# Patient Record
(demographics unavailable — no encounter records)

---

## 2025-02-24 NOTE — HISTORY OF PRESENT ILLNESS
[Home] : at home, [unfilled] , at the time of the visit. [Medical Office: ([facility])___] : at the medical office located in  [Verbal consent obtained from patient] : the patient, [unfilled] [Cataplexy] : cataplexy [Sleep Paralysis] : sleep paralysis [Hypnagogic Hallucinations] : hallucinations when falling asleep [FreeTextEntry1] : This is a 33-year-old male with narcolepsy with cataplexy here for a follow up visit.  Comorbid medical conditions include migraines, bipolar disorder, substance use disorder on Sublocade monitored through [facility] outreach and is registered with [facility] and gets weekly random tox screens, and history of ADHD.  Lumryz is working well. He currently takes Wakix and Mydayis 50 mg in AM and Adderall XR 30 mg only as needed or armodafinil 250 mg. This regimen has been working well.   WatchPAT (11/17/2019) pAHI of 3/hr. PSG/MSLT (3/10/2020) AHI 2.8/hr, MSOL 2.5 minutes, 3/4 SOREMPs. PSG/MSLT (1/19/2020) AHI 0.8/hr, MSOL 3.25 minutes, 2/4 SOREMPs.  Current regimen: Lumryz (9/2023 - current) Xywav (4/2020 - 12/16/2022, 1/5/2023 - 9/2023). Wakix (11/2020 - 4/2021, 5/2021 - current). Myadis 50 mg  Adderall XR 30 mg/armodafinil 250 mg prn  Medication History: He was diagnosed with ADHD when he was younger and previously has tried Vyvanse, Methylphenidate, Adderall. He found Vyvanse to be the most "smooth".  Sunosi: Not effective Methylphenidate: irritable Wakix: reported no significant effect and paused for 1 month but felt lows were worse so resumed. Modafinil: suboptimal effect Armodafinil: Tolerance Xyrem: 12/2020 - 4/2020 Dexedrine 10 mg TID prn - unavailable.  Vyvanse 70 mg - effective but difficulty to find due to shortage  [Hypnopompic Hallucinations] : no hallucinations when awakening

## 2025-02-24 NOTE — ASSESSMENT
[FreeTextEntry1] : This is 33-year-old male with narcolepsy with cataplexy here for a follow up visit via telehealth. Doing well with current regimen. Continue Lumryz, Wakix, Mydayis 50 mg and Adderall XR 30 mg in afternoon or armodafinil 250 as needed. Follow up in 6 months, sooner if needed.

## 2025-02-24 NOTE — HISTORY OF PRESENT ILLNESS
[Home] : at home, [unfilled] , at the time of the visit. [Medical Office: ([facility])___] : at the medical office located in  [Verbal consent obtained from patient] : the patient, [unfilled] [Cataplexy] : cataplexy [Sleep Paralysis] : sleep paralysis [Hypnagogic Hallucinations] : hallucinations when falling asleep [FreeTextEntry1] : This is a 33-year-old male with narcolepsy with cataplexy here for a follow up visit.  Comorbid medical conditions include migraines, bipolar disorder, substance use disorder on Sublocade monitored through [facility] outreach and is registered with [facility] and gets weekly random tox screens, and history of ADHD.  Lumryz is working well. He currently takes Wakix and Mydayis 50 mg in AM and Adderall XR 30 mg only as needed or armodafinil 250 mg. This regimen has been working well.   WatchPAT (11/17/2019) pAHI of 3/hr. PSG/MSLT (3/10/2020) AHI 2.8/hr, MSOL 2.5 minutes, 3/4 SOREMPs. PSG/MSLT (1/19/2020) AHI 0.8/hr, MSOL 3.25 minutes, 2/4 SOREMPs.  Current regimen: Lumryz (9/2023 - current) Xywav (4/2020 - 12/16/2022, 1/5/2023 - 9/2023). Wakix (11/2020 - 4/2021, 5/2021 - current). Myadis 50 mg  Adderall XR 30 mg/armodafinil 250 mg prn  Medication History: He was diagnosed with ADHD when he was younger and previously has tried Vyvanse, Methylphenidate, Adderall. He found Vyvanse to be the most "smooth".  Sunosi: Not effective Methylphenidate: irritable Wakix: reported no significant effect and paused for 1 month but felt lows were worse so resumed. Modafinil: suboptimal effect Armodafinil: Tolerance Xyrem: 12/2020 - 4/2020 Dexedrine 10 mg TID prn - unavailable.  Vyvanse 70 mg - effective but difficulty to find due to shortage  [Hypnopompic Hallucinations] : no hallucinations when awakening